# Patient Record
Sex: FEMALE | URBAN - NONMETROPOLITAN AREA
[De-identification: names, ages, dates, MRNs, and addresses within clinical notes are randomized per-mention and may not be internally consistent; named-entity substitution may affect disease eponyms.]

---

## 2020-11-10 ENCOUNTER — APPOINTMENT (RX ONLY)
Dept: URBAN - NONMETROPOLITAN AREA CLINIC 1 | Facility: CLINIC | Age: 60
Setting detail: DERMATOLOGY
End: 2020-11-10

## 2020-11-10 VITALS — TEMPERATURE: 98.1 F

## 2020-11-10 DIAGNOSIS — L72.0 EPIDERMAL CYST: ICD-10-CM

## 2020-11-10 DIAGNOSIS — L11.1 TRANSIENT ACANTHOLYTIC DERMATOSIS [GROVER]: ICD-10-CM

## 2020-11-10 DIAGNOSIS — D22 MELANOCYTIC NEVI: ICD-10-CM

## 2020-11-10 DIAGNOSIS — L82.1 OTHER SEBORRHEIC KERATOSIS: ICD-10-CM

## 2020-11-10 DIAGNOSIS — Z12.83 ENCOUNTER FOR SCREENING FOR MALIGNANT NEOPLASM OF SKIN: ICD-10-CM

## 2020-11-10 PROBLEM — D22.5 MELANOCYTIC NEVI OF TRUNK: Status: ACTIVE | Noted: 2020-11-10

## 2020-11-10 PROBLEM — D22.61 MELANOCYTIC NEVI OF RIGHT UPPER LIMB, INCLUDING SHOULDER: Status: ACTIVE | Noted: 2020-11-10

## 2020-11-10 PROCEDURE — ? COUNSELING

## 2020-11-10 PROCEDURE — 99203 OFFICE O/P NEW LOW 30 MIN: CPT

## 2020-11-10 PROCEDURE — ? BENIGN DESTRUCTION COSMETIC

## 2020-11-10 ASSESSMENT — LOCATION DETAILED DESCRIPTION DERM
LOCATION DETAILED: RIGHT ANTERIOR DISTAL UPPER ARM
LOCATION DETAILED: RIGHT INFERIOR MEDIAL UPPER BACK
LOCATION DETAILED: LEFT LATERAL SUPERIOR EYELID
LOCATION DETAILED: LEFT ANTERIOR DISTAL THIGH
LOCATION DETAILED: RIGHT SUPERIOR MEDIAL MIDBACK
LOCATION DETAILED: LEFT SUPERIOR MEDIAL UPPER BACK
LOCATION DETAILED: RIGHT SUPERIOR MEDIAL UPPER BACK

## 2020-11-10 ASSESSMENT — LOCATION SIMPLE DESCRIPTION DERM
LOCATION SIMPLE: RIGHT LOWER BACK
LOCATION SIMPLE: LEFT SUPERIOR EYELID
LOCATION SIMPLE: LEFT UPPER BACK
LOCATION SIMPLE: LEFT THIGH
LOCATION SIMPLE: RIGHT UPPER BACK
LOCATION SIMPLE: RIGHT UPPER ARM

## 2020-11-10 ASSESSMENT — LOCATION ZONE DERM
LOCATION ZONE: EYELID
LOCATION ZONE: LEG
LOCATION ZONE: ARM
LOCATION ZONE: TRUNK

## 2020-11-10 NOTE — PROCEDURE: BENIGN DESTRUCTION COSMETIC
Anesthesia Volume In Cc: 0.5
Post-Care Instructions: Patient instructed to wash treated area(s) with mild soap and water.  Patient is to wear sun protection, and avoid picking at the treated lesion.
Detail Level: Detailed
Consent: Discussed with patient risks of crusting, scabbing, blistering, scarring, darker or lighter pigmentary change, recurrence, incomplete removal and infection.
Price (Use Numbers Only, No Special Characters Or $): 0

## 2020-11-12 ENCOUNTER — APPOINTMENT (RX ONLY)
Dept: URBAN - NONMETROPOLITAN AREA CLINIC 1 | Facility: CLINIC | Age: 60
Setting detail: DERMATOLOGY
End: 2020-11-12

## 2020-11-12 VITALS — TEMPERATURE: 97.5 F

## 2020-11-12 DIAGNOSIS — Z41.9 ENCOUNTER FOR PROCEDURE FOR PURPOSES OTHER THAN REMEDYING HEALTH STATE, UNSPECIFIED: ICD-10-CM

## 2020-11-12 PROCEDURE — ? COSMETIC CONSULTATION - RED SPOTS

## 2020-11-12 PROCEDURE — ? BOTOX

## 2020-11-12 PROCEDURE — ? ADDITIONAL NOTES

## 2020-11-12 PROCEDURE — ? COSMETIC CONSULTATION: BROWN SPOTS

## 2020-11-12 NOTE — PROCEDURE: BOTOX
Post-Care Instructions: Patient instructed to not lie down for 4 hours and limit physical activity for 24 hours. RN recommends topical arnica and/or ice if bruising presents.\\n\\nPt instructed to contact the clinic with any questions, concerns, or post treatment complications, ie: the pt feels the product did not work for them, etc.  \\n\\Katarzyna pt concerns and questions addressed and pt verbalized understanding.
Additional Area 1 Units: 0
Consent: Written consent reviewed by RN and signed by pt. Risks include but not limited to lid/brow ptosis, bruising, swelling, diplopia, temporary effect, incomplete chemical denervation.  \\nPt denies pregnancy. \\nIf pt is breastfeeding, they are counseled to dispose of breast milk for 24 hours post botox injection. \\nPt denies current administration of anti-biotics. \\nPt denies allergy to albumin or lactose. \\nPt's taking blood thinners are counseled on the increased risk of bleeding and bruising at the injection site.
Dilution (U/0.1 Cc): 4
Lot #: [id number]
Price (Use Numbers Only, No Special Characters Or $): 345
Forehead Units: 10
Periorbital Skin Units: 12
Detail Level: Detailed
Depressor Anguli Oris Units: 8

## 2020-11-12 NOTE — PROCEDURE: ADDITIONAL NOTES
Additional Notes: I counseled the patient regarding the following:\\n\\nRN discussed the risks and benefits of botox including but not limited to bruising, muscle weakness, lid or brow ptosis, double vision, facial weakness, headaches, procedural pain, temporary benefit only.\\n\\nIt is impossible to know the exact location of the vasculature in the treatment area. If a vein or artery is compromised by the injection, pt may experience an increase in bruising as a result. This is temporary and usually resolves in a few days to a few weeks. \\n\\nPt understands botox is a neuro-modulator that is injected into the muscle, which relaxes the muscle movement. \\n\\nPatient understands that treatment with botox only lessens dynamic wrinkles on a temporary basis, usually for 2-3 months. \\n\\nPt is aware of the variability in patient response, including a lack of response to treatment, and that no guarantee of length of benefit can be made. \\n\\nPt taking blood thinners prior to treatment may experience an increased risk for bruising and bleeding at the injection site. \\n\\nPatient should not lie down for 4 hours after injections nor exercise for 24 hours. \\n\\nIf bruising presents, pt may utilize oral or topical arnica and ice for treatment. \\n\\nPt instructed to contact the clinic with any questions, concerns, or post treatment complications; ie: pt does not feel the product worked for them, etc.\\n\\nPatient understands that the treatment is cosmetic in nature and not covered by insurance.\\n\\Katarzyna pt concerns and questions addressed and pt verbalized understanding.
Detail Level: Simple

## 2020-11-19 ENCOUNTER — APPOINTMENT (RX ONLY)
Dept: URBAN - NONMETROPOLITAN AREA CLINIC 1 | Facility: CLINIC | Age: 60
Setting detail: DERMATOLOGY
End: 2020-11-19

## 2020-11-19 DIAGNOSIS — Z41.9 ENCOUNTER FOR PROCEDURE FOR PURPOSES OTHER THAN REMEDYING HEALTH STATE, UNSPECIFIED: ICD-10-CM

## 2020-11-19 PROCEDURE — ? COSMETIC FOLLOW-UP

## 2020-11-19 PROCEDURE — ? IRIDEX LASER

## 2020-11-19 ASSESSMENT — LOCATION DETAILED DESCRIPTION DERM: LOCATION DETAILED: INFERIOR MID FOREHEAD

## 2020-11-19 ASSESSMENT — LOCATION ZONE DERM: LOCATION ZONE: FACE

## 2020-11-19 ASSESSMENT — LOCATION SIMPLE DESCRIPTION DERM: LOCATION SIMPLE: INFERIOR FOREHEAD

## 2020-11-19 NOTE — PROCEDURE: IRIDEX LASER
Post Procedure Text: Alastin nectar applied. Post care reviewed and pt verbalized understanding.\\n\\nPt is aware that several treatments may be required to achieve the desired result.
Pulse Count (Optional): 1198
Treatment Number: 1
Bennett Override (Optional): 532 wavelength
Handpiece: 1000 micron
Repetition Rate: 5 ms
Post-Care Instructions: RN reviewed with the patient in detail post-care instructions. Patient is to apply vaseline with a q-tip to all crusted areas, and avoid picking at any scabs. Pt should stay away from the sun and wear sun protection until fully healed. Pt verbalized understanding.
Detail Level: Zone
Joules: 16
Moralez: 3 moralez
External Cooling Fan Speed: 5
Pre Procedure Text: Photos taken. The treatment areas were numbed for at least 15 mins, topical numbing removed with a warm towel.\\n\\nAppropriate eye protection placed on pt and pt instructed to keep their eyes closed during the treatment. \\n\\n532 wavelength used for vascular lesions.\\nE=16\\nPD=29\\nRR=3
Price (Use Numbers Only, No Special Characters Or $): 300
Consent: Written consent reviewed by RN and signed by pt.  Risks reviewed including but not limited to crusting, scabbing, blistering, scarring, darker or lighter pigmentary change, and/or incomplete removal.
Pre Procedure Text: Photos taken. The treatment areas were numbed for at least 15 mins, topical numbing removed with a warm towel.\\n\\n940 wavelength:\\nE=160\\nPD=36\\nRR=3
Repetition Rate: 3 ms
Pulse Count (Optional): 394
Bennett Override (Optional): 940 wavelength

## 2020-11-19 NOTE — PROCEDURE: COSMETIC FOLLOW-UP
Global Improvement: Good
Patient Satisfaction: Pleased
Detail Level: Zone
Treatment (Optional): Botox
Price (Use Numbers Only, No Special Characters Or $): 0
Comments (Free Text): R fh bruising on lateral injection. Pt states took 5 days to see results.

## 2020-11-20 VITALS — TEMPERATURE: 97.3 F

## 2021-01-14 ENCOUNTER — APPOINTMENT (RX ONLY)
Dept: URBAN - NONMETROPOLITAN AREA CLINIC 1 | Facility: CLINIC | Age: 61
Setting detail: DERMATOLOGY
End: 2021-01-14

## 2021-01-14 VITALS — TEMPERATURE: 97.5 F

## 2021-01-14 DIAGNOSIS — Z41.9 ENCOUNTER FOR PROCEDURE FOR PURPOSES OTHER THAN REMEDYING HEALTH STATE, UNSPECIFIED: ICD-10-CM

## 2021-01-14 PROCEDURE — ? IRIDEX LASER

## 2021-01-14 PROCEDURE — ? COSMETIC FOLLOW-UP

## 2021-01-14 ASSESSMENT — LOCATION DETAILED DESCRIPTION DERM: LOCATION DETAILED: INFERIOR MID FOREHEAD

## 2021-01-14 ASSESSMENT — LOCATION SIMPLE DESCRIPTION DERM: LOCATION SIMPLE: INFERIOR FOREHEAD

## 2021-01-14 ASSESSMENT — LOCATION ZONE DERM: LOCATION ZONE: FACE

## 2021-01-14 NOTE — PROCEDURE: COSMETIC FOLLOW-UP
Detail Level: Zone
Comments (Free Text): Pt requires second tx.
Side Effects Or Complications: Under correction
Patient Satisfaction: Very pleased
Price (Use Numbers Only, No Special Characters Or $): 0
Treatment Override (Free Text): Iridex

## 2021-01-14 NOTE — PROCEDURE: IRIDEX LASER
External Cooling Fan Speed: 5
Pulse Count (Optional): 869
Price (Use Numbers Only, No Special Characters Or $): 200
Post-Care Instructions: RN reviewed with the patient in detail post-care instructions. Patient is to apply vaseline with a q-tip to all crusted areas, and avoid picking at any scabs. Pt should stay away from the sun and wear sun protection until fully healed. Pt verbalized understanding.
Joules: 16
Moralez: 3 moralez
Detail Level: Zone
Handpiece: 1000 micron
Pre Procedure Text: Pt swished with antiseptic mouthwash for 30 seconds prior to treatment. \\n\\nPhotos taken. The treatment areas were numbed for at least 15 mins, topical numbing removed with a warm towel.\\n\\nAppropriate eye protection placed on pt and pt instructed to keep their eyes closed during the treatment. \\n\\n532 wavelength \\nE=16\\nPD=29\\nRR=3
Treatment Number: 2
Repetition Rate: 3 ms
Consent: Written consent reviewed by RN and signed by pt.  Risks reviewed including but not limited to crusting, scabbing, blistering, scarring, darker or lighter pigmentary change, and/or incomplete removal.
Post Procedure Text: Alastin nectar and sunscreen applied. Post care reviewed and pt verbalized understanding.\\n\\nPt is aware that several treatments may be required to achieve the desired result.
Bennett Override (Optional): 532 wavelength

## 2021-02-25 ENCOUNTER — APPOINTMENT (RX ONLY)
Dept: URBAN - NONMETROPOLITAN AREA CLINIC 1 | Facility: CLINIC | Age: 61
Setting detail: DERMATOLOGY
End: 2021-02-25

## 2021-02-25 VITALS — TEMPERATURE: 97.5 F

## 2021-02-25 DIAGNOSIS — Z41.9 ENCOUNTER FOR PROCEDURE FOR PURPOSES OTHER THAN REMEDYING HEALTH STATE, UNSPECIFIED: ICD-10-CM

## 2021-02-25 PROCEDURE — ? BOTOX

## 2021-02-25 PROCEDURE — ? ADDITIONAL NOTES

## 2021-02-25 NOTE — PROCEDURE: BOTOX
Post-Care Instructions: Patient instructed to not lie down for 4 hours and limit physical activity for 24 hours. RN recommends topical arnica and/or ice if bruising presents.\\n\\nPt instructed to contact the clinic with any questions, concerns, or post treatment complications, ie: the pt feels the product did not work for them, etc.  \\n\\Katarzyna pt concerns and questions addressed and pt verbalized understanding.
Additional Area 1 Units: 0
Consent: Written consent reviewed by RN and signed by pt. Risks include but not limited to lid/brow ptosis, bruising, swelling, diplopia, temporary effect, incomplete chemical denervation.  \\nPt denies pregnancy. \\nIf pt is breastfeeding, they are counseled to dispose of breast milk for 24 hours post botox injection. \\nPt denies current administration of anti-biotics. \\nPt denies allergy to albumin or lactose. \\nPt's taking blood thinners are counseled on the increased risk of bleeding and bruising at the injection site.
Dilution (U/0.1 Cc): 4
Lot #: [id number]
Price (Use Numbers Only, No Special Characters Or $): 663
Forehead Units: 10
Periorbital Skin Units: 12
Detail Level: Detailed
Depressor Anguli Oris Units: 8

## 2021-05-20 ENCOUNTER — APPOINTMENT (RX ONLY)
Dept: URBAN - NONMETROPOLITAN AREA CLINIC 1 | Facility: CLINIC | Age: 61
Setting detail: DERMATOLOGY
End: 2021-05-20

## 2021-05-20 VITALS — TEMPERATURE: 98.1 F

## 2021-05-20 DIAGNOSIS — Z41.9 ENCOUNTER FOR PROCEDURE FOR PURPOSES OTHER THAN REMEDYING HEALTH STATE, UNSPECIFIED: ICD-10-CM

## 2021-05-20 PROCEDURE — ? ADDITIONAL NOTES

## 2021-05-20 PROCEDURE — ? BOTOX

## 2021-05-20 NOTE — PROCEDURE: BOTOX
Post-Care Instructions: Patient instructed to not lie down for 4 hours and limit physical activity for 24 hours. RN recommends topical arnica and/or ice if bruising presents.\\n\\nPt instructed to contact the clinic with any questions, concerns, or post treatment complications, ie: the pt feels the product did not work for them, etc.  \\n\\Katarzyna pt concerns and questions addressed and pt verbalized understanding.
Additional Area 1 Units: 0
Consent: Written consent reviewed by RN and signed by pt. Risks include but not limited to lid/brow ptosis, bruising, swelling, diplopia, temporary effect, incomplete chemical denervation.  \\nPt denies pregnancy. \\nIf pt is breastfeeding, they are counseled to dispose of breast milk for 24 hours post botox injection. \\nPt denies current administration of anti-biotics. \\nPt denies allergy to albumin or lactose. \\nPt's taking blood thinners are counseled on the increased risk of bleeding and bruising at the injection site.
Dilution (U/0.1 Cc): 4
Lot #: [id number]
Price (Use Numbers Only, No Special Characters Or $): 022
Forehead Units: 14
Glabellar Complex Units: 10
Periorbital Skin Units: 16
Detail Level: Detailed
Depressor Anguli Oris Units: 8

## 2021-08-27 ENCOUNTER — APPOINTMENT (RX ONLY)
Dept: URBAN - NONMETROPOLITAN AREA CLINIC 1 | Facility: CLINIC | Age: 61
Setting detail: DERMATOLOGY
End: 2021-08-27

## 2021-08-27 DIAGNOSIS — Z41.9 ENCOUNTER FOR PROCEDURE FOR PURPOSES OTHER THAN REMEDYING HEALTH STATE, UNSPECIFIED: ICD-10-CM

## 2021-08-27 PROCEDURE — ? ADDITIONAL NOTES

## 2021-08-27 PROCEDURE — ? BOTOX

## 2021-08-27 NOTE — PROCEDURE: BOTOX
Post-Care Instructions: Patient instructed to not lie down for 4 hours and limit physical activity for 24 hours. RN recommends topical arnica and/or ice if bruising presents.\\n\\nPt instructed to contact the clinic with any questions, concerns, or post treatment complications, ie: the pt feels the product did not work for them, etc.  \\n\\Katarzyna pt concerns and questions addressed and pt verbalized understanding.
Additional Area 1 Units: 0
Consent: Written consent reviewed by RN and signed by pt. Risks include but not limited to lid/brow ptosis, bruising, swelling, diplopia, temporary effect, incomplete chemical denervation.  \\nPt denies pregnancy. \\nIf pt is breastfeeding, they are counseled to dispose of breast milk for 24 hours post botox injection. \\nPt denies current administration of anti-biotics. \\nPt denies allergy to albumin or lactose. \\nPt's taking blood thinners are counseled on the increased risk of bleeding and bruising at the injection site.
Dilution (U/0.1 Cc): 4
Lot #: [id number]
Price (Use Numbers Only, No Special Characters Or $): 563
Forehead Units: 14
Glabellar Complex Units: 10
Periorbital Skin Units: 16
Detail Level: Detailed
Depressor Anguli Oris Units: 8

## 2021-11-11 ENCOUNTER — APPOINTMENT (RX ONLY)
Dept: URBAN - NONMETROPOLITAN AREA CLINIC 1 | Facility: CLINIC | Age: 61
Setting detail: DERMATOLOGY
End: 2021-11-11

## 2021-11-11 DIAGNOSIS — Z12.83 ENCOUNTER FOR SCREENING FOR MALIGNANT NEOPLASM OF SKIN: ICD-10-CM

## 2021-11-11 DIAGNOSIS — D18.0 HEMANGIOMA: ICD-10-CM

## 2021-11-11 DIAGNOSIS — D22 MELANOCYTIC NEVI: ICD-10-CM

## 2021-11-11 DIAGNOSIS — L72.0 EPIDERMAL CYST: ICD-10-CM

## 2021-11-11 PROBLEM — D18.01 HEMANGIOMA OF SKIN AND SUBCUTANEOUS TISSUE: Status: ACTIVE | Noted: 2021-11-11

## 2021-11-11 PROBLEM — D22.5 MELANOCYTIC NEVI OF TRUNK: Status: ACTIVE | Noted: 2021-11-11

## 2021-11-11 PROCEDURE — ? COUNSELING

## 2021-11-11 PROCEDURE — ? OBSERVATION

## 2021-11-11 PROCEDURE — 99213 OFFICE O/P EST LOW 20 MIN: CPT

## 2021-11-11 ASSESSMENT — LOCATION DETAILED DESCRIPTION DERM
LOCATION DETAILED: LEFT INFERIOR MEDIAL MALAR CHEEK
LOCATION DETAILED: INFERIOR THORACIC SPINE
LOCATION DETAILED: LEFT MEDIAL UPPER BACK
LOCATION DETAILED: RIGHT CENTRAL MALAR CHEEK
LOCATION DETAILED: RIGHT SUPERIOR MEDIAL UPPER BACK
LOCATION DETAILED: RIGHT LATERAL SUPERIOR CHEST

## 2021-11-11 ASSESSMENT — LOCATION SIMPLE DESCRIPTION DERM
LOCATION SIMPLE: RIGHT UPPER BACK
LOCATION SIMPLE: LEFT UPPER BACK
LOCATION SIMPLE: CHEST
LOCATION SIMPLE: RIGHT CHEEK
LOCATION SIMPLE: LEFT CHEEK
LOCATION SIMPLE: UPPER BACK

## 2021-11-11 ASSESSMENT — LOCATION ZONE DERM
LOCATION ZONE: TRUNK
LOCATION ZONE: FACE

## 2021-11-11 NOTE — PROCEDURE: COUNSELING
Detail Level: Generalized
Patient Specific Counseling (Will Not Stick From Patient To Patient): Advised Alastin Retinol .25 at bedtime as tolerated.
Detail Level: Zone

## 2021-12-02 ENCOUNTER — APPOINTMENT (RX ONLY)
Dept: URBAN - NONMETROPOLITAN AREA CLINIC 1 | Facility: CLINIC | Age: 61
Setting detail: DERMATOLOGY
End: 2021-12-02

## 2021-12-02 DIAGNOSIS — Z41.9 ENCOUNTER FOR PROCEDURE FOR PURPOSES OTHER THAN REMEDYING HEALTH STATE, UNSPECIFIED: ICD-10-CM

## 2021-12-02 PROCEDURE — ? IRIDEX LASER

## 2021-12-02 PROCEDURE — ? BOTOX

## 2021-12-02 PROCEDURE — ? ADDITIONAL NOTES

## 2021-12-02 ASSESSMENT — LOCATION SIMPLE DESCRIPTION DERM: LOCATION SIMPLE: LEFT EYELID

## 2021-12-02 ASSESSMENT — LOCATION DETAILED DESCRIPTION DERM: LOCATION DETAILED: LEFT LATERAL CANTHUS

## 2021-12-02 ASSESSMENT — LOCATION ZONE DERM: LOCATION ZONE: EYELID

## 2021-12-02 NOTE — PROCEDURE: BOTOX
Post-Care Instructions: Patient instructed to not lie down for 4 hours and limit physical activity for 24 hours. RN recommends topical arnica and/or ice if bruising presents.\\n\\nPt instructed to contact the clinic with any questions, concerns, or post treatment complications, ie: the pt feels the product did not work for them, etc.  \\n\\Katarzyna pt concerns and questions addressed and pt verbalized understanding.
Additional Area 1 Units: 0
Consent: Written consent reviewed by RN and signed by pt. Risks include but not limited to lid/brow ptosis, bruising, swelling, diplopia, temporary effect, incomplete chemical denervation.  \\nPt denies pregnancy. \\nIf pt is breastfeeding, they are counseled to dispose of breast milk for 24 hours post botox injection. \\nPt denies current administration of anti-biotics. \\nPt denies allergy to albumin or lactose. \\nPt's taking blood thinners are counseled on the increased risk of bleeding and bruising at the injection site.
Dilution (U/0.1 Cc): 4
Lot #: [id number]
Price (Use Numbers Only, No Special Characters Or $): 665
Forehead Units: 14
Glabellar Complex Units: 10
Periorbital Skin Units: 16
Detail Level: Detailed
Depressor Anguli Oris Units: 8

## 2021-12-02 NOTE — PROCEDURE: IRIDEX LASER
Bennett Override (Optional): 532 wavelength
Handpiece: 1000 micron
Pre Procedure Text: E=6\\nPD=10\\nRR=3
Treatment Number: 1
Pulse Count (Optional): 11
Repetition Rate: 3 ms
Moralez: 3 moralez
Detail Level: Zone
Post-Care Instructions: RN reviewed with the patient in detail post-care instructions. Patient is to apply vaseline with a q-tip to all crusted areas, and avoid picking at any scabs. Pt should stay away from the sun and wear sun protection until fully healed. Pt verbalized understanding.
Post Procedure Text: Included with injectable tx to reduce the duration of bruising.
Joules: 6

## 2022-03-17 ENCOUNTER — APPOINTMENT (RX ONLY)
Dept: URBAN - NONMETROPOLITAN AREA CLINIC 1 | Facility: CLINIC | Age: 62
Setting detail: DERMATOLOGY
End: 2022-03-17

## 2022-03-17 DIAGNOSIS — Z41.9 ENCOUNTER FOR PROCEDURE FOR PURPOSES OTHER THAN REMEDYING HEALTH STATE, UNSPECIFIED: ICD-10-CM

## 2022-03-17 PROCEDURE — ? ADDITIONAL NOTES

## 2022-03-17 PROCEDURE — ? BOTOX

## 2022-03-17 NOTE — PROCEDURE: BOTOX
Post-Care Instructions: Patient instructed to not lie down for 4 hours and limit physical activity for 24 hours. RN recommends topical arnica and/or ice if bruising presents.\\n\\nPt instructed to contact the clinic with any questions, concerns, or post treatment complications, ie: the pt feels the product did not work for them, etc.  \\n\\Katarzyna pt concerns and questions addressed and pt verbalized understanding.
Additional Area 1 Units: 0
Consent: Written consent reviewed by RN and signed by pt. Risks include but not limited to lid/brow ptosis, bruising, swelling, diplopia, temporary effect, incomplete chemical denervation.  \\nPt denies pregnancy. \\nIf pt is breastfeeding, they are counseled to dispose of breast milk for 24 hours post botox injection. \\nPt denies current administration of anti-biotics. \\nPt denies allergy to albumin or lactose. \\nPt's taking blood thinners are counseled on the increased risk of bleeding and bruising at the injection site.
Dilution (U/0.1 Cc): 4
Lot #: [id number]
Price (Use Numbers Only, No Special Characters Or $): 456
Forehead Units: 14
Glabellar Complex Units: 10
Periorbital Skin Units: 16
Detail Level: Detailed
Depressor Anguli Oris Units: 8

## 2022-06-16 ENCOUNTER — APPOINTMENT (RX ONLY)
Dept: URBAN - NONMETROPOLITAN AREA CLINIC 1 | Facility: CLINIC | Age: 62
Setting detail: DERMATOLOGY
End: 2022-06-16

## 2022-06-16 DIAGNOSIS — Z41.9 ENCOUNTER FOR PROCEDURE FOR PURPOSES OTHER THAN REMEDYING HEALTH STATE, UNSPECIFIED: ICD-10-CM

## 2022-06-16 PROCEDURE — ? ADDITIONAL NOTES

## 2022-06-16 PROCEDURE — ? BOTOX

## 2022-06-16 NOTE — PROCEDURE: BOTOX
Comments: Pt asked to animate muscles in the treatment area and musculature was palpated for proper injection placement.
Right Pupillary Line Units: 0
Show Additional Area 5: Yes
Forehead Units: 16
Lot #: [id number]
Depressor Anguli Oris Units: 8
Glabellar Complex Units: 10
Price (Use Numbers Only, No Special Characters Or $): 956
Consent: Written consent reviewed by RN and signed by pt. Risks include but not limited to lid/brow ptosis, bruising, swelling, diplopia, temporary effect, incomplete chemical denervation.  \\nPt denies pregnancy.  \\nIf pt is breastfeeding, they are counseled to dispose of breast milk for 24 hours post botox injection. \\nPt denies current administration of anti-biotics. \\nPt denies allergy to albumin or lactose. \\nPt's taking blood thinners are counseled on the increased risk of bleeding and bruising at the injection site.
Dilution (U/0.1 Cc): 4
Post-Care Instructions: Patient instructed to not lie down for 4 hours and limit physical activity for 24 hours. RN recommends topical arnica and/or ice if bruising presents.\\n\\nPt instructed to contact the clinic with any questions, concerns, or post treatment complications.\\n\\Katarzyna pt concerns and questions addressed and pt verbalized understanding.
Detail Level: Detailed

## 2022-06-16 NOTE — PROCEDURE: ADDITIONAL NOTES
Render Risk Assessment In Note?: yes
Additional Notes: I counseled the patient regarding the following:\\n\\nRN discussed the risks and benefits of botox including but not limited to bruising, muscle weakness, lid or brow ptosis, double vision, facial weakness, headaches, procedural pain, temporary benefit only.\\n\\nIt is impossible to know the exact location of the vasculature in the treatment area. If a vein or artery is compromised by the injection, pt may experience an increase in bruising as a result. This is temporary and usually resolves in a few days to a few weeks. \\n\\nPt understands botox is a neuro-modulator that is injected into the muscle, which relaxes the muscle movement. \\n\\nPatient understands that treatment with botox only lessens dynamic wrinkles on a temporary basis, usually for 2-3 months. \\n\\nPt is aware of the variability in patient response, including a lack of response to treatment, and that no guarantee of length of benefit can be made. \\n\\nPt taking blood thinners prior to treatment may experience an increased risk for bruising and bleeding at the injection site. \\n\\nPatient should not lie down for 4 hours after injections nor exercise for 24 hours. \\n\\nIf bruising presents, pt may utilize oral or topical arnica and ice for treatment. \\n\\nPt instructed to contact the clinic with any questions, concerns, or post treatment complications.\\n\\nPatient understands that the treatment is cosmetic in nature and not covered by insurance.\\n\\Katarzyna pt concerns and questions addressed and pt verbalized understanding.
Detail Level: Simple

## 2022-09-22 ENCOUNTER — APPOINTMENT (RX ONLY)
Dept: URBAN - NONMETROPOLITAN AREA CLINIC 1 | Facility: CLINIC | Age: 62
Setting detail: DERMATOLOGY
End: 2022-09-22

## 2022-09-22 DIAGNOSIS — Z41.9 ENCOUNTER FOR PROCEDURE FOR PURPOSES OTHER THAN REMEDYING HEALTH STATE, UNSPECIFIED: ICD-10-CM

## 2022-09-22 PROBLEM — D69.2 OTHER NONTHROMBOCYTOPENIC PURPURA: Status: ACTIVE | Noted: 2022-09-22

## 2022-09-22 PROCEDURE — ? BOTOX

## 2022-09-22 PROCEDURE — ? IRIDEX LASER

## 2022-09-22 PROCEDURE — ? ADDITIONAL NOTES

## 2022-09-22 ASSESSMENT — LOCATION SIMPLE DESCRIPTION DERM: LOCATION SIMPLE: LEFT EYELID

## 2022-09-22 ASSESSMENT — LOCATION ZONE DERM: LOCATION ZONE: EYELID

## 2022-09-22 ASSESSMENT — LOCATION DETAILED DESCRIPTION DERM: LOCATION DETAILED: LEFT LATERAL CANTHUS

## 2022-09-22 NOTE — PROCEDURE: BOTOX
Glabellar Complex Units: 0
Show Mentalis Units: Yes
Comments: Pt asked to animate muscles in the treatment area and musculature was palpated for proper injection placement.
Depressor Anguli Oris Units: 8
Price (Use Numbers Only, No Special Characters Or $): 822
L Brow Units: 2
Periorbital Skin Units: 16
Detail Level: Detailed
Post-Care Instructions: Patient instructed to not lie down for 4 hours and limit physical activity for 24 hours. RN recommends topical arnica and/or ice if bruising presents.\\n\\nPt instructed to contact the clinic with any questions, concerns, or post treatment complications.\\n\\Katarzyna pt concerns and questions addressed and pt verbalized understanding.
Consent: Written consent reviewed by RN and signed by pt. Risks include but not limited to lid/brow ptosis, bruising, swelling, diplopia, temporary effect, incomplete chemical denervation.   \\nPt's taking blood thinners are counseled on the increased risk of bleeding and bruising at the injection site.
Forehead Units: 14
Dilution (U/0.1 Cc): 4
Lot #: [id number]

## 2022-09-22 NOTE — PROCEDURE: IRIDEX LASER
Post Procedure Text: Included with injectable tx to reduce the duration of bruising.
Treatment Number: 1
Moralez: 3 moralez
Detail Level: Zone
Bennett Override (Optional): 532 wavelength
Handpiece: 1000 micron
Joules: 6
Pre Procedure Text: E=6\\nPD=10\\nRR=3
Repetition Rate: 3 ms
Post-Care Instructions: RN reviewed with the patient in detail post-care instructions. Patient is to apply vaseline with a q-tip to all crusted areas, and avoid picking at any scabs. Pt should stay away from the sun and wear sun protection until fully healed. Pt verbalized understanding.
Pulse Count (Optional): 25

## 2022-11-09 NOTE — PROCEDURE: ADDITIONAL NOTES
[id number]  [address] [facility] [address] [id number]  Dept: [phone]  Dept Fax: [phone]  Loc: [phone]     Matias Gaona is a 49 y.o. female who presents today for her medical conditions/complaintsas noted below. Matias Gaona is c/o of Rash (Patient believes its shingles, rash on left buttock and on the back of her head on the left side. )        HPI:     Pt presents with rash on right side of buttocks. States rash is itching, burning and tingling. States she has had shingles before and symptoms she is having are exactly the same. Denies fever, allergic contacts, drainage, streaks from rash. Denies SOB, cough, congestion, dizziness, confusion, body aches, headache, or any other symptoms. Past Medical History:   Diagnosis Date    Allergic rhinitis 11/10/2021    Anxiety 2020    Asthma     Chronic back pain 2018    Chronic migraine without aura, intractable, without status migrainosus     Colon polyps     Depression 2018    Elevated blood pressure, situational 2018    Essential tremor     Fibromyalgia     Liver disease     Fatty Liver    Meniere disease 2017    Osteoarthritis     Sciatica of right side 2020    Tinnitus       Past Surgical History:   Procedure Laterality Date     SECTION      CHOLECYSTECTOMY, LAPAROSCOPIC  2015    Dr. Natalia Aragon  ?    ?     HYSTERECTOMY (CERVIX STATUS UNKNOWN)      LIVER BIOPSY  2015    Dr. Ashly Hoyos       Family History   Problem Relation Age of Onset    Breast Cancer Mother 50    Colon Polyps Mother     Liver Cancer Mother     Heart Disease Mother     Cancer Mother     Diabetes Mother     Heart Disease Father     Cancer Father     Colon Polyps Maternal Grandmother     Breast Cancer Maternal Grandfather 46    Colon Polyps Paternal Grandfather     Liver Cancer Paternal Grandfather     Colon Cancer Neg Hx     Esophageal Cancer Neg Hx     Liver Disease
Neg Hx     Rectal Cancer Neg Hx     Stomach Cancer Neg Hx        Social History     Tobacco Use    Smoking status: Never    Smokeless tobacco: Never   Substance Use Topics    Alcohol use: No      Current Outpatient Medications   Medication Sig Dispense Refill    valACYclovir (VALTREX) 1 g tablet Take 1 tablet by mouth 3 times daily for 7 days 21 tablet 0    predniSONE (DELTASONE) 10 MG tablet Take 6 tabs for 2 days, 5 tabs for 2 days, 4 tabs for 2 days, 3 tabs for 2 days, 2 tabs for 2 days, 1 tab for 2 days. 42 tablet 0    busPIRone (BUSPAR) 5 MG tablet TAKE 1 TABLET BY MOUTH THREE TIMES DAILY AS NEEDED FOR ANXIETY (NO FURTHER REFILLS UNTIL SEEN IN OFFICE) 90 tablet 0    rosuvastatin (CRESTOR) 40 MG tablet Take 1 tablet by mouth nightly 30 tablet 3    desvenlafaxine succinate (PRISTIQ) 50 MG TB24 extended release tablet TAKE 1 TABLET BY MOUTH ONCE DAILY 30 tablet 2    fluticasone (FLONASE) 50 MCG/ACT nasal spray 1 spray by Nasal route in the morning. 1 each 5    pantoprazole (PROTONIX) 20 MG tablet Take 1 tablet by mouth daily 30 tablet 3    vitamin C (ASCORBIC ACID) 500 MG tablet Take 500 mg by mouth daily      zinc 50 MG CAPS Take 50 mg by mouth daily      vitamin E 1000 units capsule Take 1,000 Units by mouth daily      amitriptyline (ELAVIL) 25 MG tablet Take 2 tablets by mouth nightly 60 tablet 2    amLODIPine (NORVASC) 5 MG tablet Take 1 tablet by mouth nightly 30 tablet 2    aspirin 81 MG EC tablet Take 1 tablet by mouth every evening 30 tablet 5    clopidogrel (PLAVIX) 75 MG tablet Take 1 tablet by mouth every evening 30 tablet 5    hydroCHLOROthiazide (HYDRODIURIL) 25 MG tablet Take 0.5 tablets by mouth in the morning.  30 tablet 5     Current Facility-Administered Medications   Medication Dose Route Frequency Provider Last Rate Last Admin    bupivacaine (MARCAINE) 0.5 % injection 15 mg  3 mL Intra-artICUlar Once JUAN JOSE Philip        lidocaine 1 % injection 3 mL  3 mL IntraDERmal Once Lucero Ordonez
[place] Pila, APRN         Allergies   Allergen Reactions    Latex Rash, Itching, Swelling and Hives    Codeine Anaphylaxis    Morphine Anaphylaxis    Morphine And Related Anaphylaxis    Eggs Or Egg-Derived Products        Health Maintenance   Topic Date Due    DTaP/Tdap/Td vaccine (1 - Tdap) Never done    Colorectal Cancer Screen  Never done    Shingles vaccine (1 of 2) Never done    Flu vaccine (1) 08/01/2022    HIV screen  03/29/2023 (Originally 3/8/1986)    COVID-19 Vaccine (1) 01/31/2025 (Originally 1971)    A1C test (Diabetic or Prediabetic)  05/08/2023    Lipids  05/09/2023    Depression Monitoring  07/28/2023    Breast cancer screen  08/02/2023    Pneumococcal 0-64 years Vaccine  Completed    Hepatitis C screen  Completed    Hepatitis A vaccine  Aged Out    Hib vaccine  Aged Out    Meningococcal (ACWY) vaccine  Aged Out       Subjective:     Review of Systems   Constitutional: Negative. Negative for chills, fatigue, fever and unexpected weight change. HENT: Negative. Eyes: Negative. Negative for redness and itching. Respiratory: Negative. Negative for cough, shortness of breath and wheezing. Cardiovascular: Negative. Gastrointestinal: Negative. Genitourinary: Negative. Musculoskeletal: Negative. Skin:  Positive for rash. Neurological: Negative. Hematological: Negative. Psychiatric/Behavioral: Negative. Objective:     Physical Exam  Vitals and nursing note reviewed. Constitutional:       General: She is not in acute distress. Appearance: She is well-developed. She is not ill-appearing or toxic-appearing. HENT:      Head: Normocephalic and atraumatic. Eyes:      Conjunctiva/sclera: Conjunctivae normal.      Pupils: Pupils are equal, round, and reactive to light. Neck:      Trachea: Trachea normal.   Cardiovascular:      Rate and Rhythm: Normal rate and regular rhythm.    Pulmonary:      Effort: Pulmonary effort is normal.      Breath sounds: Normal breath
Instructions on file for this visit.       Electronically signed by JUAN JOSE Fitzgerald CNP on 11/9/2022 at 8:49 AM
Additional Notes: Arnica Homeopathic Salts 30u\\n\\nArnica salts are recommended to be taken orally to prior to injectable treatment to reduce the risk of bruising.  Arnica can also help to reduce the duration of bruising, should it occur with injectable treatments. \\n\\nArnica salts can be found at natural food stores and pharmacies. \\n\\nTake Arnica orally as directed on the bottle 5-7 days before treatment with fillers or neuromodulators.\\n  \\nIt is best absorbed taken in between meals; 30 minutes before or after eating.
Detail Level: Simple
Render Risk Assessment In Note?: no

## 2023-01-19 ENCOUNTER — APPOINTMENT (RX ONLY)
Dept: URBAN - NONMETROPOLITAN AREA CLINIC 1 | Facility: CLINIC | Age: 63
Setting detail: DERMATOLOGY
End: 2023-01-19

## 2023-01-19 DIAGNOSIS — Z41.9 ENCOUNTER FOR PROCEDURE FOR PURPOSES OTHER THAN REMEDYING HEALTH STATE, UNSPECIFIED: ICD-10-CM

## 2023-01-19 PROCEDURE — ? BOTOX

## 2023-01-19 NOTE — PROCEDURE: BOTOX
Glabellar Complex Units: 0
Show Mentalis Units: Yes
Comments: Pt asked to animate muscles in the treatment area and musculature was palpated for proper injection placement.
Depressor Anguli Oris Units: 8
Price (Use Numbers Only, No Special Characters Or $): 319
L Brow Units: 2
Periorbital Skin Units: 16
Detail Level: Detailed
Post-Care Instructions: Patient instructed to not lie down for 4 hours and limit physical activity for 24 hours. RN recommends topical arnica and/or ice if bruising presents.\\n\\nPt instructed to contact the clinic with any questions, concerns, or post treatment complications.\\n\\Katarzyna pt concerns and questions addressed and pt verbalized understanding.
Consent: Written consent reviewed by RN and signed by pt. Risks include but not limited to lid/brow ptosis, bruising, swelling, diplopia, temporary effect, incomplete chemical denervation.   \\nPt's taking blood thinners are counseled on the increased risk of bleeding and bruising at the injection site.
Forehead Units: 14
Dilution (U/0.1 Cc): 4
Lot #: [id number]

## 2024-10-02 ENCOUNTER — APPOINTMENT (RX ONLY)
Dept: URBAN - NONMETROPOLITAN AREA CLINIC 1 | Facility: CLINIC | Age: 64
Setting detail: DERMATOLOGY
End: 2024-10-02

## 2024-10-02 DIAGNOSIS — Z12.83 ENCOUNTER FOR SCREENING FOR MALIGNANT NEOPLASM OF SKIN: ICD-10-CM

## 2024-10-02 DIAGNOSIS — L72.0 EPIDERMAL CYST: ICD-10-CM

## 2024-10-02 DIAGNOSIS — D22 MELANOCYTIC NEVI: ICD-10-CM

## 2024-10-02 DIAGNOSIS — L57.0 ACTINIC KERATOSIS: ICD-10-CM

## 2024-10-02 DIAGNOSIS — I83.9 ASYMPTOMATIC VARICOSE VEINS OF LOWER EXTREMITIES: ICD-10-CM

## 2024-10-02 PROBLEM — I83.92 ASYMPTOMATIC VARICOSE VEINS OF LEFT LOWER EXTREMITY: Status: ACTIVE | Noted: 2024-10-02

## 2024-10-02 PROBLEM — D22.5 MELANOCYTIC NEVI OF TRUNK: Status: ACTIVE | Noted: 2024-10-02

## 2024-10-02 PROCEDURE — ? COUNSELING

## 2024-10-02 PROCEDURE — ? LIQUID NITROGEN

## 2024-10-02 PROCEDURE — 99213 OFFICE O/P EST LOW 20 MIN: CPT | Mod: 25

## 2024-10-02 PROCEDURE — 17000 DESTRUCT PREMALG LESION: CPT

## 2024-10-02 PROCEDURE — ? BENIGN DESTRUCTION COSMETIC

## 2024-10-02 ASSESSMENT — LOCATION SIMPLE DESCRIPTION DERM
LOCATION SIMPLE: RIGHT UPPER BACK
LOCATION SIMPLE: RIGHT CHEEK
LOCATION SIMPLE: LEFT PRETIBIAL REGION
LOCATION SIMPLE: LEFT NOSE
LOCATION SIMPLE: LABIA MAJORA

## 2024-10-02 ASSESSMENT — LOCATION DETAILED DESCRIPTION DERM
LOCATION DETAILED: LEFT PROXIMAL PRETIBIAL REGION
LOCATION DETAILED: RIGHT SUPERIOR MEDIAL UPPER BACK
LOCATION DETAILED: LEFT NASAL SIDEWALL
LOCATION DETAILED: LEFT LABIUM MAJUS
LOCATION DETAILED: RIGHT INFERIOR MEDIAL UPPER BACK
LOCATION DETAILED: RIGHT CENTRAL MALAR CHEEK
LOCATION DETAILED: RIGHT LABIUM MAJUS

## 2024-10-02 ASSESSMENT — LOCATION ZONE DERM
LOCATION ZONE: FACE
LOCATION ZONE: VULVA
LOCATION ZONE: TRUNK
LOCATION ZONE: LEG
LOCATION ZONE: NOSE

## 2024-10-02 NOTE — PROCEDURE: BENIGN DESTRUCTION COSMETIC
Post-Care Instructions: Patient instructed to wash treated area(s) with mild soap and water.  Patient is to wear sun protection, and avoid picking at the treated lesion.
Price (Use Numbers Only, No Special Characters Or $): 160
Detail Level: Detailed
Anesthesia Volume In Cc: 2
Anesthesia Type: 1% lidocaine with 1:100,000 epinephrine and a 1:10 solution of 8.4% sodium bicarbonate
Consent: Discussed with patient risks of crusting, scabbing, blistering, scarring, darker or lighter pigmentary change, recurrence, incomplete removal and infection.

## 2024-10-02 NOTE — PROCEDURE: LIQUID NITROGEN
Render Note In Bullet Format When Appropriate: No
Show Applicator Variable?: Yes
Post-Care Instructions: I reviewed with the patient in detail post-care instructions. Patient is to wear sunprotection, and avoid picking at any of the treated lesions. Pt may apply Vaseline to crusted or scabbing areas.
Number Of Freeze-Thaw Cycles: 2 freeze-thaw cycles
Duration Of Freeze Thaw-Cycle (Seconds): 0
Detail Level: Simple
Consent: The patient's verbal consent was obtained including but not limited to risks of crusting, scabbing, blistering, scarring, darker or lighter pigmentary change, recurrence, incomplete removal and infection.

## 2025-06-10 ENCOUNTER — APPOINTMENT (OUTPATIENT)
Dept: URBAN - NONMETROPOLITAN AREA CLINIC 1 | Facility: CLINIC | Age: 65
Setting detail: DERMATOLOGY
End: 2025-06-10

## 2025-06-10 DIAGNOSIS — L71.8 OTHER ROSACEA: ICD-10-CM | Status: INADEQUATELY CONTROLLED

## 2025-06-10 PROCEDURE — ? COUNSELING

## 2025-06-10 PROCEDURE — ? PRESCRIPTION

## 2025-06-10 RX ORDER — METRONIDAZOLE 7.5 MG/G
CREAM TOPICAL BID
Qty: 45 | Refills: 6 | Status: ERX | COMMUNITY
Start: 2025-06-10

## 2025-06-10 RX ORDER — DOXYCYCLINE HYCLATE 50 MG/1
CAPSULE, GELATIN COATED ORAL BID
Qty: 60 | Refills: 5 | Status: ERX | COMMUNITY
Start: 2025-06-10

## 2025-06-10 RX ADMIN — METRONIDAZOLE: 7.5 CREAM TOPICAL at 00:00

## 2025-06-10 RX ADMIN — DOXYCYCLINE HYCLATE: 50 CAPSULE, GELATIN COATED ORAL at 00:00

## 2025-06-10 ASSESSMENT — LOCATION SIMPLE DESCRIPTION DERM
LOCATION SIMPLE: LEFT CHEEK
LOCATION SIMPLE: RIGHT CHEEK

## 2025-06-10 ASSESSMENT — LOCATION DETAILED DESCRIPTION DERM
LOCATION DETAILED: LEFT INFERIOR MEDIAL MALAR CHEEK
LOCATION DETAILED: RIGHT INFERIOR CENTRAL MALAR CHEEK

## 2025-06-10 ASSESSMENT — LOCATION ZONE DERM: LOCATION ZONE: FACE

## 2025-07-08 ENCOUNTER — APPOINTMENT (OUTPATIENT)
Dept: URBAN - NONMETROPOLITAN AREA CLINIC 1 | Facility: CLINIC | Age: 65
Setting detail: DERMATOLOGY
End: 2025-07-08

## 2025-07-08 DIAGNOSIS — L71.8 OTHER ROSACEA: ICD-10-CM | Status: RESOLVING

## 2025-07-08 DIAGNOSIS — L72.0 EPIDERMAL CYST: ICD-10-CM

## 2025-07-08 PROCEDURE — ? PRESCRIPTION MEDICATION MANAGEMENT

## 2025-07-08 PROCEDURE — ? DEFER

## 2025-07-08 PROCEDURE — ? COUNSELING

## 2025-07-08 ASSESSMENT — LOCATION ZONE DERM
LOCATION ZONE: NOSE
LOCATION ZONE: FACE

## 2025-07-08 ASSESSMENT — LOCATION SIMPLE DESCRIPTION DERM
LOCATION SIMPLE: NOSE
LOCATION SIMPLE: LEFT CHEEK
LOCATION SIMPLE: RIGHT CHEEK

## 2025-07-08 ASSESSMENT — LOCATION DETAILED DESCRIPTION DERM
LOCATION DETAILED: RIGHT INFERIOR CENTRAL MALAR CHEEK
LOCATION DETAILED: NASAL ROOT
LOCATION DETAILED: LEFT INFERIOR MEDIAL MALAR CHEEK

## 2025-07-08 NOTE — PROCEDURE: DEFER
X Size Of Lesion In Cm (Optional): 0
Procedure To Be Performed At Next Visit: Electrodesiccation (Cosmetic)
Introduction Text (Please End With A Colon): The following procedure was deferred:
Detail Level: Simple

## 2025-07-08 NOTE — PROCEDURE: PRESCRIPTION MEDICATION MANAGEMENT
Render In Strict Bullet Format?: No
Detail Level: Simple
Continue Regimen: metronidazole crema twice daily
Modify Regimen: wean doxycycline slowly:  Take 100 mg daily for 2 weeks, then discontinue if possible.